# Patient Record
(demographics unavailable — no encounter records)

---

## 2024-11-13 NOTE — PHYSICAL EXAM
[Well Developed] : well developed [Well Nourished] : well nourished [No Acute Distress] : no acute distress [Normal Conjunctiva] : normal conjunctiva [Normal Venous Pressure] : normal venous pressure [Normal S1, S2] : normal S1, S2 [No Murmur] : no murmur [No Rub] : no rub [No Gallop] : no gallop [Good Air Entry] : good air entry [No Respiratory Distress] : no respiratory distress  [No Edema] : no edema [Moves all extremities] : moves all extremities [No Focal Deficits] : no focal deficits [Normal Speech] : normal speech [Alert and Oriented] : alert and oriented

## 2024-11-15 NOTE — REASON FOR VISIT
[Family Member] : family member [FreeTextEntry3] : Dr. Morrow [FreeTextEntry1] : ------------------------------------------------------------------------ ASHLEIGH SIERRA is a 78 year old woman with a history of type 2 diabetes, levothyroxine, TET2 mutation, and DLBCL seen for follow up of syncope and cardiovascular risk factors.   Prior Cancer Treatments: ------------------------------------------------------------------------ Chemo/targeted therapy: 12/2023-4/18/2024: R-mini-CHOP x 6 ------------------------------------------------------------------------

## 2024-11-15 NOTE — REVIEW OF SYSTEMS
[Feeling Fatigued] : feeling fatigued [Anxiety] : anxiety [Negative] : Gastrointestinal [SOB] : no shortness of breath [Dyspnea on exertion] : not dyspnea during exertion [Chest Discomfort] : no chest discomfort [Palpitations] : no palpitations [Syncope] : no syncope [Dizziness] : no dizziness

## 2024-11-15 NOTE — HISTORY OF PRESENT ILLNESS
[FreeTextEntry1] : Interval History:   Echo 2024 - normal LVEF and GLS. She feels well. She has not had any further episodes of syncope or near syncope. Last lipid panel showed LDL 92 on atorvastatin 20. Follow up PET/CT 2024 - JOSE LUIS, but atherosclerotic calcifications present.   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 She reports a history of recurrent episodes of fainting, which increased in frequency following her cancer diagnosis and initiation of chemotherapy in 2023.  The patient's episodes of fainting have occurred since a young age, often in stressful situations or confined spaces. She experiences sweating and a sense of impending loss of consciousness before the episodes.  She also notes anxiety in certain situations, such as flying.    She experienced three episodes in a relatively short period of time: one in Dr. Morrow's office after receiving her diagnosis, one in front of her apartment building, and one in the shower after her first chemo treatment. She was evaluated in the [facility] ER after the episode in front of her apartment building. She reports while standing outside with her family member, her legs gave out, and neighbors had to help prevent her from falling. She did not totally lose consciousness. The episode in the doctor's office and in the shower were preceded by palpitations and diaphoresis. She reports not having chest pain or breathing problems.  In the past, when she felt episodes coming on, she would splash cool water on her face, neck, which helped. She never sustained trauma or injury, but did collapse on an airplane once in the past. She was previously referred to a cardiologist for evaluation, and thinks a Holter monitor was performed, but she did not experience symptoms while wearing the monitor. Other tests showed no worrisome findings per patient.  She has no history of heart attack or stroke. A previous stress test performed as part of a routine checkup was normal per patient. She takes metformin for type 2 diabetes and thyroid hormone replacement. Genetic testing was notable for a TET2 mutation.  Medications: - metformin - atorvastatin  - pantoprazole  - famotidine  - Synthroid  The patient lives alone in [facility]. But currently staying with her relative in [place], [place] during her chemotherapy treatment. She does not smoke. She does not consume alcohol.   2024: She completed R-CHOP therapy for her lymphoma one month ago. She had no further issues with syncope or near syncope. Her Zio monitor did not show any bradyarrhythmia. There were no significant symptom triggers (pushed accidentally when it came loose after a shower). The monitor showed paroxysmal SVT - 30 second run of likely atrial tachycardia.   Cardiovascular Summary: ---------------------------------------------- EC/15/2024: NSR 70 bpm, normal ECG 2024: NSR 74 bpm, minimal voltage for LVH 2024: NSR 90 bpm, non-specific ST- T wave abnormality ---------------------------------------------- Echo: 2024: EF 71 %, GLS -20, mild LAE, aorta 3.8, borderline PHTN 2023: EF 75 %, GLS -19, structurally normal heart 2023: EF 65 %, normal LV mass, mild LAE, no pericardial effusion, aorta 3.8 cm, mild increased wall thickness ---------------------------------------------- Remote/ambulatory rhythm monitorin2024: 14 day Zio XT - no pauses. paroxysmal SVT (AT vs AF), longest 30 seconds. ------------------------------------------------ 2024: PET/CT -  Coronary and large vessel calcifications. No aneurysm.

## 2024-11-15 NOTE — ASSESSMENT
[FreeTextEntry1] : ------------------------------------- She is doing well post treatment for lymphoma without recurrent syncope or significant symptomatic complaints.  # Syncope and collapse: patient's history of recurrent episodes of syncope or near syncope are suggestive of vasovagal syncope. No orthostatic hypotension or significant postural change in systolic blood pressure. However, some of the more recent episodes may be related to anemia or intravascular volume changes, associated with lymphoma therapy. We discussed the mechanisms, natural history, and management of vasovagal syncope, particularly with an emphasis on safety precautions to prevent injury.  # Risk for cardiomyopathy: She received a number of chemotherapeutic agents which may affect the heart or cause arrhythmia and is at increased risk for anthracycline cardiotoxicity due to diabetes, age, and TET2 mutations are associated with increased risk for major adverse cardiovascular events also. - continue atorvastatin and ASCVD risk factor control (especially given TET2 mutation) - encouraged physical therapy to improve conditioning and would benefit from a regular aerobic exercise program - Lipid panel: 4/2024: LDL 96, non- - post treatment troponin T: 28 (increased from 11 at baseline) - post treatment pro-BNP: 294 (normalized from 1500 during therapy) - LVEF and GLS normal on post-treatment echo 6/2024  Will increase atorvastatin to 40 mg daily now given atherosclerotic and large vessel calcifications seen on the most recent PET scan, and presence of TET2 mutation, diabetes. Discussed with the patient.  Follow up in one year with me and will repeat echo in one year.  Above recommendations were discussed with the patient and all questions were answered to the best of my ability and to their apparent satisfaction.

## 2024-11-15 NOTE — REASON FOR VISIT
[Family Member] : family member [FreeTextEntry3] : Dr. Mororw [FreeTextEntry1] : ------------------------------------------------------------------------ ASHLEIGH SIERRA is a 78 year old woman with a history of type 2 diabetes, levothyroxine, TET2 mutation, and DLBCL seen for follow up of syncope and cardiovascular risk factors.   Prior Cancer Treatments: ------------------------------------------------------------------------ Chemo/targeted therapy: 12/2023-4/18/2024: R-mini-CHOP x 6 ------------------------------------------------------------------------

## 2024-11-15 NOTE — HISTORY OF PRESENT ILLNESS
[FreeTextEntry1] : Interval History:   Echo 2024 - normal LVEF and GLS. She feels well. She has not had any further episodes of syncope or near syncope. Last lipid panel showed LDL 92 on atorvastatin 20. Follow up PET/CT 2024 - JOSE LUIS, but atherosclerotic calcifications present.   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 She reports a history of recurrent episodes of fainting, which increased in frequency following her cancer diagnosis and initiation of chemotherapy in 2023.  The patient's episodes of fainting have occurred since a young age, often in stressful situations or confined spaces. She experiences sweating and a sense of impending loss of consciousness before the episodes.  She also notes anxiety in certain situations, such as flying.    She experienced three episodes in a relatively short period of time: one in Dr. Morrow's office after receiving her diagnosis, one in front of her apartment building, and one in the shower after her first chemo treatment. She was evaluated in the [facility] ER after the episode in front of her apartment building. She reports while standing outside with her family member, her legs gave out, and neighbors had to help prevent her from falling. She did not totally lose consciousness. The episode in the doctor's office and in the shower were preceded by palpitations and diaphoresis. She reports not having chest pain or breathing problems.  In the past, when she felt episodes coming on, she would splash cool water on her face, neck, which helped. She never sustained trauma or injury, but did collapse on an airplane once in the past. She was previously referred to a cardiologist for evaluation, and thinks a Holter monitor was performed, but she did not experience symptoms while wearing the monitor. Other tests showed no worrisome findings per patient.  She has no history of heart attack or stroke. A previous stress test performed as part of a routine checkup was normal per patient. She takes metformin for type 2 diabetes and thyroid hormone replacement. Genetic testing was notable for a TET2 mutation.  Medications: - metformin - atorvastatin  - pantoprazole  - famotidine  - Synthroid  The patient lives alone in [facility]. But currently staying with her relative in [place], [place] during her chemotherapy treatment. She does not smoke. She does not consume alcohol.   2024: She completed R-CHOP therapy for her lymphoma one month ago. She had no further issues with syncope or near syncope. Her Zio monitor did not show any bradyarrhythmia. There were no significant symptom triggers (pushed accidentally when it came loose after a shower). The monitor showed paroxysmal SVT - 30 second run of likely atrial tachycardia.   Cardiovascular Summary: ---------------------------------------------- EC/15/2024: NSR 70 bpm, normal ECG 2024: NSR 74 bpm, minimal voltage for LVH 2024: NSR 90 bpm, non-specific ST- T wave abnormality ---------------------------------------------- Echo: 2024: EF 71 %, GLS -20, mild LAE, aorta 3.8, borderline PHTN 2023: EF 75 %, GLS -19, structurally normal heart 2023: EF 65 %, normal LV mass, mild LAE, no pericardial effusion, aorta 3.8 cm, mild increased wall thickness ---------------------------------------------- Remote/ambulatory rhythm monitorin2024: 14 day Zio XT - no pauses. paroxysmal SVT (AT vs AF), longest 30 seconds. ------------------------------------------------ 2024: PET/CT -  Coronary and large vessel calcifications. No aneurysm.

## 2024-12-12 NOTE — HISTORY OF PRESENT ILLNESS
[FreeTextEntry1] : Has found that she is using more famotidine. Does not take omeprazole any longer.  She uses it every evening before bed. On the days she went without the medication, she noticed that some symptoms of reflux returned -- particularly at night and when she wakes up in the morning before breakfast. Not able to label any particular foods as the culprit for her symptoms.  Even the breakthrough symptoms are quite mild and not like what they were in the past.   Saw Cardiology and had her atorvastatin increased to 40mg daily. Tolerating it well.   Not really too active during the week.  On weekends, she is running errands so she is walking more.  Getting some leg cramps on occasion.   Has been checking her BP at home: 134/79 was her last reading.   Awaiting the prior-auth to have her Medi-Port removed.

## 2024-12-12 NOTE — DISCUSSION/SUMMARY
[Cancer Type / Location / Histology Subtype: ________] : Cancer Type / Location / Histology Subtype: [unfilled] [Systemic Therapy (chemotherapy, hormonal therapy, other)] : Systemic Therapy (chemotherapy, hormonal therapy, other): Yes [Surgery] : Surgery: No [Radiation] : Radiation: No [FreeTextEntry1] : Rituximab, Cyclophosphamide, Doxorubicin, Vincristine, Prednisone (RCHOP)

## 2025-03-06 NOTE — HISTORY OF PRESENT ILLNESS
[Disease:__________________________] : Disease: [unfilled] [Day: ___] : Day: [unfilled] [de-identified] : Ms. Sanchez is a 76 yo woman with newly dx'd DLBCL. Approx early Nov., 2023,  felt chills, had intermittent fevers (low grade 100, one night 103), lost about 20 lb. with decr appetite. [place] saw PMD[place] [facility] [place]. Sent to [facility] 11/17/23, Hgb 6.7. D/c 11/28. Got 1U prbc 11/1, one unit pre discharge. Tmax at home since d/c 99.6.  CT C/A/P 11/17/23 - showed non specific GGO RUL; mesenteric, peripancreatic and retroperitoneal nodes measuring up to 4 cm. EUS on 11/20/23 (Dr. Chet Venegas) showed non-obstructing Schatzki ring in lower third of the esophagus, diffuse atrophic mucosa in entire stomach, few inflammatory polyps, neg duodenum, U/S showed abn'l perihepatic nodes adjacent to head of pancreas, max diam 3.2 cm.  Pancreas, visualized liver (-); few paraaortic nodes seen. Perihepatic node was biopsied. This showed DLBCL, non-GCB type. Lymphoma was (+) for CD20, PAX5, MUM1 (bright), BCL2 (bright), CD30 (few cells), and (-) for CD10, BCL6, c-MYC, LEANDRO, cyclin D1. Ki-67 was about 40%.  Gastric biopsy showed H. pylori associated chronic active gastritis. BMA/Bx showed normal trilineage hematopoiesis, with no evidence of lymphoma. Iron stores were present.  MRI 11/27/23 showed the peripancreatic LAD. Also noted - multistation LAD up to 4 cm greatest within small bowel mesentery,  and retrocaval, aortocaval and left paraaortic LAD. Liver, spleen were normal. She has a h/o HTN, CKD, HLD, T2DM, hypothyroidism. During the admission, she was given empiric abs (vanco, cefepme) which were d/c's when no infectious source was found. She had low level (+) CMV DNA PCR. She was afebrile except for temp of 100.8 on 11/21 and 11/27.   [FreeTextEntry1] : R mini C x 6 4/18/24 HOP [de-identified] : DLBCL - now completed 6 cycle Mini R-CHOP (in April 2024) complication by GERD, nausea fatigue and neuropathy to feet shuffle to gait (gait now improved, denies recent falls) tested positive H. pylori  - completed treatment and cleared.  PM &R  - she's doing PT and LE swelling has resolved.  Diabetic- continue follow up Dr Mcmahon for DM, comprehensive care and survivorship.  3/6/25 Here for follow up with daughter. Had recent cold but denies fever and cough now resolved. Denies weight loss, night sweats, lymphadenopathy. Endorses stable b/l LE peripheral neuropathy. No issues walking. No further syncope.  Still has mediport in place.

## 2025-03-06 NOTE — HISTORY OF PRESENT ILLNESS
[Disease:__________________________] : Disease: [unfilled] [Day: ___] : Day: [unfilled] [de-identified] : Ms. Sanchez is a 78 yo woman with newly dx'd DLBCL. Approx early Nov., 2023,  felt chills, had intermittent fevers (low grade 100, one night 103), lost about 20 lb. with decr appetite. She saw PMD, [place] [facility] [place] 7.5. Sent to [facility] 11/17/23, Hgb 6.7. D/c 11/28. Got 1U prbc 11/1, one unit pre discharge. Tmax at home since d/c 99.6.  CT C/A/P 11/17/23 - showed non specific GGO RUL; mesenteric, peripancreatic and retroperitoneal nodes measuring up to 4 cm. EUS on 11/20/23 (Dr. Chet Venegas) showed non-obstructing Schatzki ring in lower third of the esophagus, diffuse atrophic mucosa in entire stomach, few inflammatory polyps, neg duodenum, U/S showed abn'l perihepatic nodes adjacent to head of pancreas, max diam 3.2 cm.  Pancreas, visualized liver (-); few paraaortic nodes seen. Perihepatic node was biopsied. This showed DLBCL, non-GCB type. Lymphoma was (+) for CD20, PAX5, MUM1 (bright), BCL2 (bright), CD30 (few cells), and (-) for CD10, BCL6, c-MYC, LEANDRO, cyclin D1. Ki-67 was about 40%.  Gastric biopsy showed H. pylori associated chronic active gastritis. BMA/Bx showed normal trilineage hematopoiesis, with no evidence of lymphoma. Iron stores were present.  MRI 11/27/23 showed the peripancreatic LAD. Also noted - multistation LAD up to 4 cm greatest within small bowel mesentery,  and retrocaval, aortocaval and left paraaortic LAD. Liver, spleen were normal. She has a h/o HTN, CKD, HLD, T2DM, hypothyroidism. During the admission, she was given empiric abs (vanco, cefepme) which were d/c's when no infectious source was found. She had low level (+) CMV DNA PCR. She was afebrile except for temp of 100.8 on 11/21 and 11/27.   [FreeTextEntry1] : R mini C x 6 4/18/24 HOP [de-identified] : DLBCL - now completed 6 cycle Mini R-CHOP (in April 2024) complication by GERD, nausea fatigue and neuropathy to feet shuffle to gait (gait now improved, denies recent falls) tested positive H. pylori  - completed treatment and cleared.  PM &R  - she's doing PT and LE swelling has resolved.  Diabetic- continue follow up Dr Mcmahon for DM, comprehensive care and survivorship.  3/6/25 Here for follow up with daughter. Had recent cold but denies fever and cough now resolved. Denies weight loss, night sweats, lymphadenopathy. Endorses stable b/l LE peripheral neuropathy. No issues walking. No further syncope.  Still has mediport in place.

## 2025-03-06 NOTE — ASSESSMENT
[Curative] : Goals of care discussed with patient: Curative [Palliative Care Plan] : not applicable at this time [FreeTextEntry1] : 80 yo woman with DLBCL, non-GCB type diagnosed Nov 2023 and completed 6 cycles of mini R-CHOP in April 2024 here for follow up. She initially presented with severe anemia; she has H. pylori associated gastritis without evidence of bleeding. BMA/Bx was (-) for involvement by lymphoma. NGS shows a TET2 mutation of unclear significance now but of potential significance should she have persistent anemia. Marrow showed no abnormalities in erythroid, myeloid or megakaryocytic morphology; myeloid cells have normal granularity.  Interim PET scan in 3/2024 showed Deauville 2. Post-therapy PET/CT 9/2024: No evidence of residual FDG avid lymphoma. Also withs table right apical groundglass nodule. Recommend dedicated chest CT follow-up in one year.  patient has neuropathy from DM -> has mild tingling and numbness sensation, which is stable. GERD - using Famotidine and Protonix  CBC, CMP results reviewed:   WBC: 7.08, Hgb 12.6. Plt 202K, ANC: 4.31 Tbili 2.0 and slowly uptrending  Discussed necessary follow up care appts labs and scans   - Has a history of dizziness/syncope with positional change -> cardiology Dr Stafford for syncope episodes prior to starting chemo (work up negative on Holter monitor) - Atorvastatin increased to 40mg and due to repeat ECHO 6/2025 - Diabetic medical care with Dr. Mcmahon  - Mild hyperbilirubinemia, unclear etiology, advise to follow up with PMD for further work up, currently asymptomatic - Renal insufficiency stable, advised to follow up with her nephrologist - may benefit from ACEi/ARB?  Will repeat CT CAP now r/o recurrence but clinically doing well and no clinical evidence of relapse at this time, scan ordered Will also arrange for port removal. To draw coags today.  RTO 3 months for follow up and labs  Patient seen and discussed with Dr. Morrow. Gloria Crockett DO, MPH Medical Oncology Fellow

## 2025-03-06 NOTE — REVIEW OF SYSTEMS
[Fatigue] : fatigue [Negative] : Constitutional [FreeTextEntry7] : mild GERD [de-identified] : neuropathy, stable [de-identified] : diabetic

## 2025-03-06 NOTE — REVIEW OF SYSTEMS
[Fatigue] : fatigue [Negative] : Constitutional [FreeTextEntry7] : mild GERD [de-identified] : neuropathy, stable [de-identified] : diabetic

## 2025-03-06 NOTE — END OF VISIT
[] : Fellow [FreeTextEntry3] : Doing well, no further episodes of dizziness, syncope. Anemia has fully reversed. Plan surveillance CT C/A/P in coming week. Will schedule for mediport removal.

## 2025-03-06 NOTE — HISTORY OF PRESENT ILLNESS
[Disease:__________________________] : Disease: [unfilled] [Day: ___] : Day: [unfilled] [de-identified] : Ms. Sanchez is a 78 yo woman with newly dx'd DLBCL. Approx early Nov., 2023,  felt chills, had intermittent fevers (low grade 100, one night 103), lost about 20 lb. with decr appetite. [place] saw PMD, [place] [facility] [place]. Sent to [facility] 11/17/23, Hgb 6.7. D/c 11/28. Got 1U prbc 11/1, one unit pre discharge. Tmax at home since d/c 99.6.  CT C/A/P 11/17/23 - showed non specific GGO RUL; mesenteric, peripancreatic and retroperitoneal nodes measuring up to 4 cm. EUS on 11/20/23 (Dr. Chet Venegas) showed non-obstructing Schatzki ring in lower third of the esophagus, diffuse atrophic mucosa in entire stomach, few inflammatory polyps, neg duodenum, U/S showed abn'l perihepatic nodes adjacent to head of pancreas, max diam 3.2 cm.  Pancreas, visualized liver (-); few paraaortic nodes seen. Perihepatic node was biopsied. This showed DLBCL, non-GCB type. Lymphoma was (+) for CD20, PAX5, MUM1 (bright), BCL2 (bright), CD30 (few cells), and (-) for CD10, BCL6, c-MYC, LEANDRO, cyclin D1. Ki-67 was about 40%.  Gastric biopsy showed H. pylori associated chronic active gastritis. BMA/Bx showed normal trilineage hematopoiesis, with no evidence of lymphoma. Iron stores were present.  MRI 11/27/23 showed the peripancreatic LAD. Also noted - multistation LAD up to 4 cm greatest within small bowel mesentery,  and retrocaval, aortocaval and left paraaortic LAD. Liver, spleen were normal. She has a h/o HTN, CKD, HLD, T2DM, hypothyroidism. During the admission, she was given empiric abs (vanco, cefepme) which were d/c's when no infectious source was found. She had low level (+) CMV DNA PCR. She was afebrile except for temp of 100.8 on 11/21 and 11/27.   [FreeTextEntry1] : R mini C x 6 4/18/24 HOP [de-identified] : DLBCL - now completed 6 cycle Mini R-CHOP (in April 2024) complication by GERD, nausea fatigue and neuropathy to feet shuffle to gait (gait now improved, denies recent falls) tested positive H. pylori  - completed treatment and cleared.  PM &R  - she's doing PT and LE swelling has resolved.  Diabetic- continue follow up Dr Mcmahon for DM, comprehensive care and survivorship.  3/6/25 Here for follow up with daughter. Had recent cold but denies fever and cough now resolved. Denies weight loss, night sweats, lymphadenopathy. Endorses stable b/l LE peripheral neuropathy. No issues walking. No further syncope.  Still has mediport in place.

## 2025-03-06 NOTE — REVIEW OF SYSTEMS
[Fatigue] : fatigue [Negative] : Constitutional [FreeTextEntry7] : mild GERD [de-identified] : neuropathy, stable [de-identified] : diabetic

## 2025-03-06 NOTE — ASSESSMENT
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 She initially presented with severe anemia; she has H. pylori associated gastritis without evidence of bleeding. BMA/Bx was (-) for involvement by lymphoma. NGS shows a TET2 mutation of unclear significance now but of potential significance should she have persistent anemia. Marrow showed no abnormalities in erythroid, myeloid or megakaryocytic morphology; myeloid cells have normal granularity.  Interim PET scan in 3/2024 showed Deauville 2. Post-therapy PET/CT 9/2024: No evidence of residual FDG avid lymphoma. Also withs table right apical groundglass nodule. Recommend dedicated chest CT follow-up in one year.  patient has neuropathy from DM -> has mild tingling and numbness sensation, which is stable. GERD - using Famotidine and Protonix  CBC, CMP results reviewed:   WBC: 7.08, Hgb 12.6. Plt 202K, ANC: 4.31 Tbili 2.0 and slowly uptrending  Discussed necessary follow up care appts labs and scans   - Has a history of dizziness/syncope with positional change -> cardiology Dr Stafford for syncope episodes prior to starting chemo (work up negative on Holter monitor) - Atorvastatin increased to 40mg and due to repeat ECHO 6/2025 - Diabetic medical care with Dr. Mcmahon  - Mild hyperbilirubinemia, unclear etiology, advise to follow up with PMD for further work up, currently asymptomatic - Renal insufficiency stable, advised to follow up with her nephrologist - may benefit from ACEi/ARB?  Will repeat CT CAP now r/o recurrence but clinically doing well and no clinical evidence of relapse at this time, scan ordered Will also arrange for port removal. To draw coags today.  RTO 3 months for follow up and labs  Patient seen and discussed with Dr. Morrow. Gloria Crockett DO, MPH Medical Oncology Fellow

## 2025-04-03 NOTE — ASSESSMENT
[FreeTextEntry1] : Lymphoma with completion of Rx for port removal.  COnsent obtained.  Anesthesia plan formulated and discussed with anesthesiology.  Port removed without incident.  EBL=minimal.  Full report to follow.

## 2025-04-03 NOTE — HISTORY OF PRESENT ILLNESS
[FreeTextEntry1] : alert and oriented accompanied by son Иван [phone] took synthroid this morning [FreeTextEntry5] : yesterday 8pm [FreeTextEntry6] : Dr Wetzel

## 2025-04-03 NOTE — PAST MEDICAL HISTORY
[Increasing age ( >40 years old)] : Increasing age ( >40 years old) [Malignancy] : Malignancy [No therapy indicated for cases scheduled for less than one hour] : No therapy indicated for cases scheduled for less than one hour. [FreeTextEntry1] : Malignant Hyperthermia Screening Tool and Risk of Bleeding Assessment  Ms. ASHLEIGH SIERRA denies family history of unexpected death following Anesthesia or Exercise. Denies Family history of Malignant Hyperthermia, Muscle or Neuromuscular disorder and High Temperature following exercise.  Ms. ASHLEIGH SIERRA denies history of Muscle Spasm, Dark or Chocolate - Colored urine and Unanticipated fever immediately following anesthesia or serious exercise.  Ms. SIERRA also denies bleeding tendencies/ Risks of Bleeding.

## 2025-04-03 NOTE — HISTORY OF PRESENT ILLNESS
[FreeTextEntry1] : alert and oriented accompanied by son Иван [phone] took synthroid this morning [FreeTextEntry5] : yesterday 8pm [FreeTextEntry6] : Dr Wetzel

## 2025-04-03 NOTE — HISTORY OF PRESENT ILLNESS
[FreeTextEntry1] : alert and oriented accompanied by son Иван [phone] took synthroid this morning [FreeTextEntry5] : yesterday 8pm [FreeTextEntry6] : Dr Wetzel

## 2025-04-03 NOTE — HISTORY OF PRESENT ILLNESS
[FreeTextEntry1] : alert and oriented accompanied by son Иван [phone] took synthroid this morning [FreeTextEntry5] : yesterday 8pm [FreeTextEntry6] : Dr Wetzel

## 2025-04-03 NOTE — PROCEDURE
[D/C IV on discharge] : D/C IV on discharge [Resume diet] : resume diet [Site check for bleeding/hematoma] : Site check for bleeding/hematoma [Vital signs on admission the q 15 mins x2] : Vital signs on admission the q 15 mins x2 [FreeTextEntry1] : Right chest wall mediport removal

## 2025-04-08 NOTE — DISCUSSION/SUMMARY
[Cancer Type / Location / Histology Subtype: ________] : Cancer Type / Location / Histology Subtype: [unfilled] [Surgery] : Surgery: No [Radiation] : Radiation: No [Systemic Therapy (chemotherapy, hormonal therapy, other)] : Systemic Therapy (chemotherapy, hormonal therapy, other): Yes [FreeTextEntry1] : Rituximab, Cyclophosphamide, Doxorubicin, Vincristine, Prednisone (RCHOP)

## 2025-04-08 NOTE — PHYSICAL EXAM
[No Acute Distress] : no acute distress [Normal Sclera/Conjunctiva] : normal sclera/conjunctiva [No JVD] : no jugular venous distention [No Lymphadenopathy] : no lymphadenopathy [Supple] : supple [No Respiratory Distress] : no respiratory distress  [Normal Rate] : normal rate  [Regular Rhythm] : with a regular rhythm [Normal S1, S2] : normal S1 and S2 [No Edema] : there was no peripheral edema [No Masses] : no palpable masses [No Nipple Discharge] : no nipple discharge [No Axillary Lymphadenopathy] : no axillary lymphadenopathy [Soft] : abdomen soft [Non Tender] : non-tender [Normal Supraclavicular Nodes] : no supraclavicular lymphadenopathy [Normal Axillary Nodes] : no axillary lymphadenopathy [Normal Posterior Cervical Nodes] : no posterior cervical lymphadenopathy [Normal Anterior Cervical Nodes] : no anterior cervical lymphadenopathy [No CVA Tenderness] : no CVA  tenderness [No Spinal Tenderness] : no spinal tenderness [Coordination Grossly Intact] : coordination grossly intact [Normal Affect] : the affect was normal [Normal Insight/Judgement] : insight and judgment were intact [de-identified] : No breast mass or chest wall tenderness on exam.

## 2025-04-08 NOTE — PHYSICAL EXAM
[No Acute Distress] : no acute distress [Normal Sclera/Conjunctiva] : normal sclera/conjunctiva [No JVD] : no jugular venous distention [No Lymphadenopathy] : no lymphadenopathy [Supple] : supple [No Respiratory Distress] : no respiratory distress  [Normal Rate] : normal rate  [Regular Rhythm] : with a regular rhythm [Normal S1, S2] : normal S1 and S2 [No Edema] : there was no peripheral edema [No Masses] : no palpable masses [No Nipple Discharge] : no nipple discharge [No Axillary Lymphadenopathy] : no axillary lymphadenopathy [Soft] : abdomen soft [Non Tender] : non-tender [Normal Supraclavicular Nodes] : no supraclavicular lymphadenopathy [Normal Axillary Nodes] : no axillary lymphadenopathy [Normal Posterior Cervical Nodes] : no posterior cervical lymphadenopathy [Normal Anterior Cervical Nodes] : no anterior cervical lymphadenopathy [No CVA Tenderness] : no CVA  tenderness [No Spinal Tenderness] : no spinal tenderness [Coordination Grossly Intact] : coordination grossly intact [Normal Affect] : the affect was normal [Normal Insight/Judgement] : insight and judgment were intact [de-identified] : No breast mass or chest wall tenderness on exam.

## 2025-04-08 NOTE — HISTORY OF PRESENT ILLNESS
[FreeTextEntry1] : Here for follow up.  Overall feeling well.  Noticed a few days of urinary pressure and dysuria. No noticeable hematuria. Some slight abdominal pain.  These symptoms resolved after a few weeks.   Had ECG completed in Nov 2024.  Completed echocardiogram in June 2024.   PCP: Dr. Sekou Steev ([facility])

## 2025-04-08 NOTE — HISTORY OF PRESENT ILLNESS
[FreeTextEntry1] : Here for follow up.  Overall feeling well.  Noticed a few days of urinary pressure and dysuria. No noticeable hematuria. Some slight abdominal pain.  These symptoms resolved after a few weeks.   Had ECG completed in Nov 2024.  Completed echocardiogram in June 2024.   PCP: Dr. Sekou Steve ([facility])

## 2025-06-02 NOTE — HISTORY OF PRESENT ILLNESS
[FreeTextEntry1] : HTN Abnormal mammogram [de-identified] : Has now started losartan 25mg for the last 6 weeks.  Tolerating it well.  Still notices elevated blood pressures on occasion -- 140-150/80-90.  Has some readings in the 128-135/70-80 range.  Would like to change her pharmacy to [facility] [facility].  Meds: Losartan 25mg Atorvastatin 40mg Levothyroxine 88 mcgs Metformin 500mg BID Takes famotidine only as needed and she does not need it as often anymore.   Otherwise, she feels well. Got her Shingles vaccine. Completed her diagnostic mammogram and they recommended a 6 month follow up in October 2025.   No other complaints. Is trying to walk when the weather is nice.  But does not reliably walk as a routine.

## 2025-06-05 NOTE — END OF VISIT
[facility] Ambulatory New Patient Note    Chief Complaint   Patient presents with   •  Symptoms     Neurogenic bladder         Subjective:  Sheila Cline is a 31 year old female seen today with a chief complaint of neurogenic bladder.  Per chart review of old records, lab values/study results, and patient-given history:    1)  Neurogenic bladder  - Patient has a longstanding diagnosis of neurogenic bladder with urethral closure (2011), urethrovaginal fistula repair (2011 and 2014) and is managed with a chronic SP.  Over the last few years, she's had SP changes through [facility].  No recent UTIs, no gross hematuria.  No urethral leakage or vaginal leakage.            Past Medical History:   Diagnosis Date   • Allergy    • Decubitus ulcer of coccyx    • Depression     in remission   • GERD (gastroesophageal reflux disease)    • Hyperlipidemia     goal is 100 or less   • MRSA (methicillin resistant staph aureus) culture positive 5/28/14    sacral pressure ulcer   • Neurogenic bladder    • Spina bifida    • Thyroid disease        Past Surgical History:   Procedure Laterality Date   • ------------other-------------  8/1/2014    urethral closure   • Colostomy  12 y/o   • Sacrococcygeal ulcer removal      multiple closures   • Suprapubic tube placement  2010   • Urethral fistula repair  2014    urethrovaginal fistula repair   • Ventriculoperitoneal shunt  1988       ALLERGIES:   Allergen Reactions   • Levaquin SHORTNESS OF BREATH   • Clindamycin RASH and PRURITUS   • Ceclor [Cefaclor] HIVES     unknown   • Cephalosporins RASH     Rash   • Imitrex [Sumatriptan Succinate]      Do not use do to shunt   • Latex   (Environmental) RASH   • Penicillins      unknown       Family History   Problem Relation Age of Onset   • Genitourinary Neg Hx         neg gu cancer       Social History     Tobacco Use   • Smoking status: Never Smoker   • Smokeless tobacco: Never Used   Substance Use Topics   • Alcohol use: No      Alcohol/week: 0.0 standard drinks   • Drug use: No       Current Outpatient Medications   Medication Sig Dispense Refill   • buPROPion (WELLBUTRIN XL) 150 MG 24 hr tablet Take 150 mg by mouth daily.     • melatonin 3 MG Take 1 mg by mouth at bedtime as needed. Indications: Trouble Sleeping      • PARoxetine (PAXIL) 40 MG tablet Take 1 tablet by mouth every morning. 30 tablet 5   • docusate sodium-sennosides (SENOKOT S) 50-8.6 MG per tablet Take 2 tablets by mouth daily.     • trospium 60 MG extended-release capsule Take 1 capsule by mouth daily. 30 capsule 5   • levothyroxine (SYNTHROID, LEVOTHROID) 88 MCG tablet Take 1 tablet by mouth daily. 90 tablet 3   • Fexofenadine HCl (ALLEGRA ALLERGY PO)      • DISPENSE Please dispense throat lozenges as needed for cough, dry throat, throat pain 30 each 1   • Cholecalciferol (VITAMIN D3) 2000 UNITS capsule Take 2,000 Units by mouth daily. 60 capsule 3   • cyanocobalamin (VITAMIN B-12) 500 MCG tablet Take 1 tablet by mouth 2 times daily. 180 tablet 3   • naproxen (NAPROSYN) 500 MG tablet Take 1 tablet by mouth 2 times daily. 40 tablet 5   • ferrous sulfate 325 (65 FE) MG tablet Take 1 tablet by mouth daily. 30 tablet 12   • DAILY MULTIPLE VITAMINS Tab Take 1 tablet by mouth daily. 30 tablet 12   • omeprazole (PRILOSEC) 20 MG capsule Take 1 capsule by mouth daily. (Patient taking differently: PRN) 30 capsule 1   • AMETHIA 0.15-0.03 &0.01 MG per tablet Take 1 tablet by mouth daily. 3 packet 4   • Pyridoxine HCl (VITAMIN B-6) 500 MG tablet Take 1,000 mg by mouth daily.       • loratadine (CLARITIN) 10 MG tablet Take 10 mg by mouth daily. Indications: Hayfever       No current facility-administered medications for this visit.      Facility-Administered Medications Ordered in Other Visits   Medication Dose Route Frequency Provider Last Rate Last Dose   • alteplase (CATHFLO ACTIVASE) injection 0.5 mg  0.5 mg Intracatheter PRN Mert Link MD       • alteplase (CATHFLO  [] : Fellow ACTIVASE) injection 1 mg  1 mg Intracatheter PRN Mert Link MD       • alteplase (CATHFLO ACTIVASE) injection 2 mg  2 mg Intracatheter PRN Mert Link MD       • heparin (porcine) 10 UNIT/ML lock flush 30 Units  3 mL Intracatheter PRN Mert Link MD       • lidocaine-prilocaine (EMLA) 2.5-2.5 % cream   Topical PRN Mert Link MD           Review of Systems:  I personally reviewed the review of systems history obtained and entered into the chart by the nurse.  I have reviewed and confirmed the pertinent positives and negatives with the patient and agree with the documentation.        Physical Exam:    Vitals:   There is no height or weight on file to calculate BMI.  Vitals:    12/02/19 1250   BP: 120/80   Pulse: 72     Constitutional:  The patient is well nourished, in no acute distress, appears stated age.   Psychiatric:  Normal mood/affect.  Ears, Nose, Mouth, Throat:  Normal external appearance of ears and nose  Respiratory:  Respiratory effort normal and non-labored.   Genitourinary:  SP draining yellow urine with some mucus      Results Reviewed:    Recent Labs   Lab 09/17/19  0554 06/21/19 1947 06/06/19  0725   WBC 9.2 10.9 8.3   HGB 12.6 12.9 11.6   HCT 41 41.2 38     Recent Labs   Lab 06/21/19 1947 12/29/18  2122   Sodium 140 142   Potassium 3.9 3.9   Chloride 103 105   Carbon Dioxide 30 26   BUN 22* 18   Creatinine 0.51 0.51   Glucose 175* 118*   Albumin  --  2.8*     No results for input(s): PTT, PT, INR in the last 8765 hours.      U/A:  No recent      Imaging Studies (personally reviewed):    No recent    _________________________________________________________________________      Lab test results reviewed as above      Assessment/Plan:  Sheila Cline is a 31 year old female with the following urologic issues:    1)  neurogenic bladder with urethral closure (2011), urethrovaginal fistula repair (2011 and 2014) and is managed with a chronic SP  New Problem with Workup  [FreeTextEntry3] : Continues to do well. No evidence of disease recurrence. No major late sequelae of therapy apparent. Neuropathy mild-moderate, more related to DM than to therapy with VCR based on history. Planned as Follows:  - renal/bladder US  - RTC for cysto through SP tube in 1 year      - Instructions provided as documented in the After Visit Summary.  - The patient indicated understanding of the diagnosis and agreed with the plan of care.           Thank you for allowing me to participate in the care of this patient.  Tom Bowling MD

## 2025-06-05 NOTE — REVIEW OF SYSTEMS
[FreeTextEntry7] : mild GERD [de-identified] : neuropathy improved hands, continues in feet [de-identified] : diabetic

## 2025-06-05 NOTE — HISTORY OF PRESENT ILLNESS
ambulatory [de-identified] : Ms. Sanchez is a 76 yo woman with newly dx'd DLBCL. Approx early Nov., 2023,  felt chills, had intermittent fevers (low grade 100, one night 103), lost about 20 lb. with decr appetite. She saw PMD, went to [facility] [place], Hgb 7.5. Sent to [facility] 11/17/23, Hgb 6.7. D/c 11/28. Got 1U prbc 11/1, one unit pre discharge. Tmax at home since d/c 99.6.  CT C/A/P 11/17/23 - showed non specific GGO RUL; mesenteric, peripancreatic and retroperitoneal nodes measuring up to 4 cm. EUS on 11/20/23 (Dr. Chet Venegas) showed non-obstructing Schatzki ring in lower third of the esophagus, diffuse atrophic mucosa in entire stomach, few inflammatory polyps, neg duodenum, U/S showed abn'l perihepatic nodes adjacent to head of pancreas, max diam 3.2 cm.  Pancreas, visualized liver (-); few paraaortic nodes seen. Perihepatic node was biopsied. This showed DLBCL, non-GCB type. Lymphoma was (+) for CD20, PAX5, MUM1 (bright), BCL2 (bright), CD30 (few cells), and (-) for CD10, BCL6, c-MYC, LEANDRO, cyclin D1. Ki-67 was about 40%.  Gastric biopsy showed H. pylori associated chronic active gastritis. BMA/Bx showed normal trilineage hematopoiesis, with no evidence of lymphoma. Iron stores were present.  MRI 11/27/23 showed the peripancreatic LAD. Also noted - multistation LAD up to 4 cm greatest within small bowel mesentery,  and retrocaval, aortocaval and left paraaortic LAD. Liver, spleen were normal. She has a h/o HTN, CKD, HLD, T2DM, hypothyroidism. During the admission, she was given empiric abs (vanco, cefepme) which were d/c's when no infectious source was found. She had low level (+) CMV DNA PCR. She was afebrile except for temp of 100.8 on 11/21 and 11/27.   [FreeTextEntry1] : EDD ZHANG, completed 4/18/2024 [de-identified] : DLBCL - now completed 6 cycle Mini R-CHOP ( in April 2024) complication by GERD , nausea fatigue and neuropathy to feet shuffle to gait tested positive H. pylori  - completed treatment and cleared.  PM &R  - she's doing PT and LE swelling has resolved.  Diabetic- continue follow up Dr Mcmahon for DM , comprehensive care and survivorship.  6/5/25: Follow up. Presents with her daughter. She denies night sweats, shortness of breath, fatigue, endorses excellent appetite. She has dizziness and lightheadedness and neuropathy in her feet bilaterally. She has had GERD-like symptoms but not lately. She takes famotidine intermittently only when she feels like taking it. Dr. Hardin saw her in Nov 2024 and  increased her dose of atorvastatin.   CT CAP 3/26/25 JOSE LUIS.

## 2025-06-05 NOTE — ASSESSMENT
[FreeTextEntry1] : 79 yo woman with  DLBCL, non-GCB type.   Presented with severe anemia; she had H. pylori associated gastritis without evidence of bleeding. BMA/Bx was (-) for involvement by lymphoma. NGS showed a TET2 mutation of unclear significance now but of potential significance should she have persistent anemia. Marrow showed no abnormalities in erythroid, myeloid or megakaryocytic morphology; myeloid cells have normal granularity.   . She appeared frail for her age; and was treated with R-mini CHOP using at the outset somewhat higher doses, which she tolerated with appropriate support. Interim PET scan in 3/2024 showed Deauville 2. Post therapy PET CT 9/6/24:  No evidence of residual FDG avid lymphoma.  Stable right apical groundglass nodule. Recommend dedicated chest CT follow-up in one year. CT C/A/P 3/26/25: JOSE LUIS, stable 7 mm RUL nodule  patient has neuropathy from DM-> has mild tingling and numbness sensation.. Monitor for now. GERD - using Famotidine   CBC today: WBC 5.6, Hgb 11.5, . /76 today. At home 120s SBP. Dr. Kirby increased losartan to 50 mg daily. discussed necessary follow up care apts labs and scans  has dizziness with positional change-> saw cardiology Dr Stafford for syncope episodes prior to starting chemo (work up negative on Holter monitor) diabetic medical care, survivorship f/u  Dr Kirby   check CMP, LDH   RTC in 3 months and then will schedule CT C/A/P  Patient seen and d/w Dr. Morrow  Instructed to contact our office with any new/worsening symptoms. Patient educated regarding plan of care, all questions/concerns addressed to the best of my abilities and patient's apparent satisfaction.  Liliam Yan MD PGY5 Hematology/Oncology Pager: [phone]

## 2025-06-05 NOTE — HISTORY OF PRESENT ILLNESS
[de-identified] : Ms. Sanchez is a 78 yo woman with newly dx'd DLBCL. Approx early Nov., 2023,  felt chills, had intermittent fevers (low grade 100, one night 103), lost about 20 lb. with decr appetite. She saw PMD, went to [facility] [place], H[place] 7.5. Sent to [facility] 11/17/23, Hgb 6.7. D/c 11/28. Got 1U prbc 11/1, one unit pre discharge. Tmax at home since d/c 99.6.  CT C/A/P 11/17/23 - showed non specific GGO RUL; mesenteric, peripancreatic and retroperitoneal nodes measuring up to 4 cm. EUS on 11/20/23 (Dr. Chet Venegas) showed non-obstructing Schatzki ring in lower third of the esophagus, diffuse atrophic mucosa in entire stomach, few inflammatory polyps, neg duodenum, U/S showed abn'l perihepatic nodes adjacent to head of pancreas, max diam 3.2 cm.  Pancreas, visualized liver (-); few paraaortic nodes seen. Perihepatic node was biopsied. This showed DLBCL, non-GCB type. Lymphoma was (+) for CD20, PAX5, MUM1 (bright), BCL2 (bright), CD30 (few cells), and (-) for CD10, BCL6, c-MYC, LEANDRO, cyclin D1. Ki-67 was about 40%.  Gastric biopsy showed H. pylori associated chronic active gastritis. BMA/Bx showed normal trilineage hematopoiesis, with no evidence of lymphoma. Iron stores were present.  MRI 11/27/23 showed the peripancreatic LAD. Also noted - multistation LAD up to 4 cm greatest within small bowel mesentery,  and retrocaval, aortocaval and left paraaortic LAD. Liver, spleen were normal. She has a h/o HTN, CKD, HLD, T2DM, hypothyroidism. During the admission, she was given empiric abs (vanco, cefepme) which were d/c's when no infectious source was found. She had low level (+) CMV DNA PCR. She was afebrile except for temp of 100.8 on 11/21 and 11/27.   [FreeTextEntry1] : EDD ZHANG, completed 4/18/2024 [de-identified] : DLBCL - now completed 6 cycle Mini R-CHOP ( in April 2024) complication by GERD , nausea fatigue and neuropathy to feet shuffle to gait tested positive H. pylori  - completed treatment and cleared.  PM &R  - she's doing PT and LE swelling has resolved.  Diabetic- continue follow up Dr Mcmahon for DM , comprehensive care and survivorship.  6/5/25: Follow up. Presents with her daughter. She denies night sweats, shortness of breath, fatigue, endorses excellent appetite. She has dizziness and lightheadedness and neuropathy in her feet bilaterally. She has had GERD-like symptoms but not lately. She takes famotidine intermittently only when she feels like taking it. Dr. Hardin saw her in Nov 2024 and  increased her dose of atorvastatin.   CT CAP 3/26/25 JOSE LUIS.

## 2025-06-05 NOTE — REVIEW OF SYSTEMS
[FreeTextEntry7] : mild GERD [de-identified] : neuropathy improved hands, continues in feet [de-identified] : diabetic

## 2025-06-05 NOTE — END OF VISIT
[] : Fellow [FreeTextEntry3] : Continues to do well. No evidence of disease recurrence. No major late sequelae of therapy apparent. Neuropathy mild-moderate, more related to DM than to therapy with VCR based on history.